# Patient Record
Sex: FEMALE | Race: WHITE | NOT HISPANIC OR LATINO | Employment: OTHER
[De-identification: names, ages, dates, MRNs, and addresses within clinical notes are randomized per-mention and may not be internally consistent; named-entity substitution may affect disease eponyms.]

---

## 2017-02-28 ENCOUNTER — PRIOR ORIGINAL RECORDS (OUTPATIENT)
Dept: OTHER | Age: 67
End: 2017-02-28

## 2017-03-02 PROCEDURE — 82340 ASSAY OF CALCIUM IN URINE: CPT

## 2017-03-03 ENCOUNTER — LAB ENCOUNTER (OUTPATIENT)
Dept: LAB | Facility: HOSPITAL | Age: 67
End: 2017-03-03
Attending: PHYSICAL MEDICINE & REHABILITATION
Payer: MEDICARE

## 2017-03-03 DIAGNOSIS — M81.0 SENILE OSTEOPOROSIS: Primary | ICD-10-CM

## 2017-03-03 LAB
CALCIUM URINE CALC: 218 MG/24HR (ref 42–353)
SPECIMEN VOL UR: 1450 ML

## 2017-04-04 ENCOUNTER — PRIOR ORIGINAL RECORDS (OUTPATIENT)
Dept: OTHER | Age: 67
End: 2017-04-04

## 2017-10-11 ENCOUNTER — PRIOR ORIGINAL RECORDS (OUTPATIENT)
Dept: OTHER | Age: 67
End: 2017-10-11

## 2017-11-29 ENCOUNTER — PRIOR ORIGINAL RECORDS (OUTPATIENT)
Dept: OTHER | Age: 67
End: 2017-11-29

## 2018-02-05 ENCOUNTER — PRIOR ORIGINAL RECORDS (OUTPATIENT)
Dept: OTHER | Age: 68
End: 2018-02-05

## 2018-03-06 ENCOUNTER — PRIOR ORIGINAL RECORDS (OUTPATIENT)
Dept: OTHER | Age: 68
End: 2018-03-06

## 2018-04-05 ENCOUNTER — PRIOR ORIGINAL RECORDS (OUTPATIENT)
Dept: OTHER | Age: 68
End: 2018-04-05

## 2018-05-11 ENCOUNTER — PRIOR ORIGINAL RECORDS (OUTPATIENT)
Dept: OTHER | Age: 68
End: 2018-05-11

## 2018-10-29 ENCOUNTER — PRIOR ORIGINAL RECORDS (OUTPATIENT)
Dept: OTHER | Age: 68
End: 2018-10-29

## 2018-11-12 ENCOUNTER — PRIOR ORIGINAL RECORDS (OUTPATIENT)
Dept: OTHER | Age: 68
End: 2018-11-12

## 2018-11-16 LAB
ALBUMIN: 4.2 G/DL
ALBUMIN: 4.3 G/DL
ALBUMIN: 4.3 G/DL
ALBUMIN: 4.4 G/DL
ALBUMIN: 4.4 G/DL
ALKALINE PHOSPHATATE(ALK PHOS): 49 IU/L
ALKALINE PHOSPHATATE(ALK PHOS): 52 IU/L
ALKALINE PHOSPHATATE(ALK PHOS): 59 IU/L
ALKALINE PHOSPHATATE(ALK PHOS): 61 IU/L
ALKALINE PHOSPHATATE(ALK PHOS): 66 IU/L
ALT (SGPT): 13 U/L
ALT (SGPT): 19 U/L
ALT (SGPT): 23 U/L
ALT (SGPT): 27 U/L
AST (SGOT): 26 U/L
AST (SGOT): 27 U/L
AST (SGOT): 30 U/L
AST (SGOT): 32 U/L
AST (SGOT): 33 U/L
BILIRUBIN TOTAL: 0.7 MG/DL
BILIRUBIN TOTAL: 0.8 MG/DL
BILIRUBIN TOTAL: 0.9 MG/DL
BILIRUBIN TOTAL: 1 MG/DL
BILIRUBIN TOTAL: 1 MG/DL
BUN: 15 MG/DL
BUN: 15 MG/DL
BUN: 19 MG/DL
BUN: 22 MG/DL
CALCIUM: 8.8 MG/DL
CALCIUM: 8.9 MG/DL
CALCIUM: 9.1 MG/DL
CALCIUM: 9.3 MG/DL
CHLORIDE: 102 MEQ/L
CHLORIDE: 103 MEQ/L
CHLORIDE: 104 MEQ/L
CHLORIDE: 106 MEQ/L
CHOLESTEROL, TOTAL: 140 MG/DL
CHOLESTEROL, TOTAL: 200 MG/DL
CHOLESTEROL, TOTAL: 291 MG/DL
CREATININE KINASE: 120 U/L
CREATININE KINASE: 157 U/L
CREATININE KINASE: 274 U/L
CREATININE, SERUM: 0.84 MG/DL
CREATININE, SERUM: 0.9 MG/DL
FREE T4: 1.45 MG/DL
FREE T4: 1.51 MG/DL
FREE T4: 1.53 MG/DL
FREE T4: 1.61 MG/DL
GLUCOSE: 100 MG/DL
GLUCOSE: 100 MG/DL
GLUCOSE: 85 MG/DL
GLUCOSE: 85 MG/DL
GLUCOSE: 88 MG/DL
GLUCOSE: 98 MG/DL
GLUCOSE: 98 MG/DL
HDL CHOLESTEROL: 110 MG/DL
HDL CHOLESTEROL: 66 MG/DL
HDL CHOLESTEROL: 97 MG/DL
HEMATOCRIT: 43 %
HEMATOCRIT: 43.3 %
HEMATOCRIT: 44.5 %
HEMATOCRIT: 46.4 %
HEMOGLOBIN: 14 G/DL
HEMOGLOBIN: 14.4 G/DL
HEMOGLOBIN: 14.4 G/DL
HEMOGLOBIN: 14.7 G/DL
LDL CHOLESTEROL: 168 MG/DL
LDL CHOLESTEROL: 58 MG/DL
LDL CHOLESTEROL: 94 MG/DL
MAGNESIUM: 2.1 MG/DL
NON-HDL CHOLESTEROL: 103 MG/DL
NON-HDL CHOLESTEROL: 181 MG/DL
NON-HDL CHOLESTEROL: 72 MG/DL
PLATELETS: 265 K/UL
PLATELETS: 294 K/UL
PLATELETS: 295 K/UL
PLATELETS: 315 K/UL
POTASSIUM, SERUM: 3.8 MEQ/L
POTASSIUM, SERUM: 4.2 MEQ/L
POTASSIUM, SERUM: 4.7 MEQ/L
POTASSIUM, SERUM: 5.2 MEQ/L
PROTEIN, TOTAL: 6.3 G/DL
PROTEIN, TOTAL: 6.5 G/DL
PROTEIN, TOTAL: 6.7 G/DL
PROTEIN, TOTAL: 6.7 G/DL
PROTEIN, TOTAL: 6.9 G/DL
PTH, INTACT: 35 PG/ML
RED BLOOD COUNT: 4.5 X 10-6/U
RED BLOOD COUNT: 4.53 X 10-6/U
RED BLOOD COUNT: 4.6 X 10-6/U
RED BLOOD COUNT: 4.8 X 10-6/U
SGOT (AST): 26 IU/L
SGOT (AST): 27 IU/L
SGOT (AST): 27 IU/L
SGOT (AST): 30 IU/L
SGOT (AST): 32 IU/L
SGOT (AST): 33 IU/L
SGPT (ALT): 13 IU/L
SGPT (ALT): 15 IU/L
SGPT (ALT): 19 IU/L
SGPT (ALT): 23 IU/L
SGPT (ALT): 27 IU/L
SODIUM: 137 MEQ/L
SODIUM: 140 MEQ/L
SODIUM: 141 MEQ/L
SODIUM: 143 MEQ/L
THYROID STIMULATING HORMONE: 0.55 MLU/L
THYROID STIMULATING HORMONE: 1.88 MLU/L
THYROID STIMULATING HORMONE: 3.66 MLU/L
THYROID STIMULATING HORMONE: 5.41 MLU/L
TRIGLYCERIDES: 47 MG/DL
TRIGLYCERIDES: 66 MG/DL
TRIGLYCERIDES: 71 MG/DL
VITAMIN D 25-OH: 38.1 NG/ML
VITAMIN D 25-OH: 48.1 NG/ML
VITAMIN D 25-OH: 51.4 NG/ML
VITAMIN D 25-OH: 59.7 NG/ML
WHITE BLOOD COUNT: 4.2 X 10-3/U
WHITE BLOOD COUNT: 4.4 X 10-3/U
WHITE BLOOD COUNT: 5.1 X 10-3/U
WHITE BLOOD COUNT: 5.2 X 10-3/U

## 2018-11-20 ENCOUNTER — HOSPITAL (OUTPATIENT)
Dept: OTHER | Age: 68
End: 2018-11-20
Attending: INTERNAL MEDICINE

## 2018-11-20 ENCOUNTER — IMAGING SERVICES (OUTPATIENT)
Dept: OTHER | Age: 68
End: 2018-11-20

## 2018-11-20 ENCOUNTER — PRIOR ORIGINAL RECORDS (OUTPATIENT)
Dept: OTHER | Age: 68
End: 2018-11-20

## 2018-11-20 LAB
CHOLESTEROL, TOTAL: 194 MG/DL
HDL CHOLESTEROL: 70 MG/DL
LDL CHOLESTEROL: 103 MG/DL
NON-HDL CHOLESTEROL: 118 MG/DL
TRIGLYCERIDES: 76 MG/DL

## 2018-11-21 ENCOUNTER — PRIOR ORIGINAL RECORDS (OUTPATIENT)
Dept: OTHER | Age: 68
End: 2018-11-21

## 2018-11-23 LAB
ALBUMIN: 4.2 G/DL
ALKALINE PHOSPHATATE(ALK PHOS): 55 IU/L
BILIRUBIN TOTAL: 0.6 MG/DL
BUN: 14 MG/DL
BUN: 19 MG/DL
CALCIUM: 9.3 MG/DL
CALCIUM: 9.8 MG/DL
CHLORIDE: 104 MEQ/L
CHLORIDE: 104 MEQ/L
CREATININE, SERUM: 0.8 MG/DL
CREATININE, SERUM: 0.9 MG/DL
GLUCOSE: 87 MG/DL
GLUCOSE: 90 MG/DL
POTASSIUM, SERUM: 4.3 MEQ/L
POTASSIUM, SERUM: 5 MEQ/L
PROTEIN, TOTAL: 6.2 G/DL
PTH, INTACT: 20 PG/ML
SGOT (AST): 27 IU/L
SGPT (ALT): 20 IU/L
SODIUM: 140 MEQ/L
SODIUM: 143 MEQ/L
UFCT: 265.25 CA SCORE
VITAMIN D 25-OH: 48 NG/ML

## 2018-11-29 ENCOUNTER — HOSPITAL ENCOUNTER (OUTPATIENT)
Dept: CV DIAGNOSTICS | Facility: HOSPITAL | Age: 68
Discharge: HOME | End: 2018-11-29
Attending: INTERNAL MEDICINE
Payer: MEDICARE

## 2018-11-29 DIAGNOSIS — R94.30 ABNORMAL RESULTS OF CARDIOVASCULAR FUNCTION STUDIES: ICD-10-CM

## 2018-11-29 DIAGNOSIS — Z82.49 FHX: CORONARY ARTERY DISEASE: ICD-10-CM

## 2018-11-29 DIAGNOSIS — I10 HYPERTENSION, ESSENTIAL: ICD-10-CM

## 2018-11-29 PROCEDURE — 93350 STRESS TTE ONLY: CPT | Performed by: INTERNAL MEDICINE

## 2018-11-29 PROCEDURE — 93018 CV STRESS TEST I&R ONLY: CPT | Performed by: INTERNAL MEDICINE

## 2018-11-29 PROCEDURE — 93017 CV STRESS TEST TRACING ONLY: CPT | Performed by: INTERNAL MEDICINE

## 2018-11-30 ENCOUNTER — PRIOR ORIGINAL RECORDS (OUTPATIENT)
Dept: OTHER | Age: 68
End: 2018-11-30

## 2018-12-04 ENCOUNTER — PRIOR ORIGINAL RECORDS (OUTPATIENT)
Dept: OTHER | Age: 68
End: 2018-12-04

## 2018-12-14 ENCOUNTER — PRIOR ORIGINAL RECORDS (OUTPATIENT)
Dept: OTHER | Age: 68
End: 2018-12-14

## 2018-12-28 ENCOUNTER — PRIOR ORIGINAL RECORDS (OUTPATIENT)
Dept: OTHER | Age: 68
End: 2018-12-28

## 2018-12-28 LAB
ALBUMIN: 4.3 G/DL
ALKALINE PHOSPHATATE(ALK PHOS): 67 IU/L
ALT (SGPT): 23 U/L
AST (SGOT): 32 U/L
BILIRUBIN TOTAL: 0.9 MG/DL
BUN: 18 MG/DL
CALCIUM: 9.1 MG/DL
CHLORIDE: 106 MEQ/L
CHOLESTEROL, TOTAL: 155 MG/DL
CREATININE, SERUM: 1 MG/DL
GLUCOSE: 90 MG/DL
GLUCOSE: 90 MG/DL
HDL CHOLESTEROL: 78 MG/DL
LDL CHOLESTEROL: 63 MG/DL
NON-HDL CHOLESTEROL: 77 MG/DL
POTASSIUM, SERUM: 5 MEQ/L
PROTEIN, TOTAL: 6.5 G/DL
SGOT (AST): 32 IU/L
SGPT (ALT): 23 IU/L
SODIUM: 141 MEQ/L
TRIGLYCERIDES: 71 MG/DL

## 2019-01-01 ENCOUNTER — EXTERNAL RECORD (OUTPATIENT)
Dept: HEALTH INFORMATION MANAGEMENT | Facility: OTHER | Age: 69
End: 2019-01-01

## 2019-02-28 VITALS
BODY MASS INDEX: 29.62 KG/M2 | SYSTOLIC BLOOD PRESSURE: 158 MMHG | TEMPERATURE: 98.8 F | HEART RATE: 70 BPM | DIASTOLIC BLOOD PRESSURE: 102 MMHG | WEIGHT: 128 LBS | OXYGEN SATURATION: 97 % | HEIGHT: 55 IN | RESPIRATION RATE: 18 BRPM

## 2019-05-09 ENCOUNTER — HOSPITAL (OUTPATIENT)
Dept: OTHER | Age: 69
End: 2019-05-09
Attending: INTERNAL MEDICINE

## 2019-11-06 RX ORDER — LOSARTAN POTASSIUM 50 MG/1
TABLET ORAL
Qty: 90 TABLET | Refills: 3 | Status: SHIPPED | OUTPATIENT
Start: 2019-11-06 | End: 2020-05-07 | Stop reason: SDUPTHER

## 2019-12-12 LAB
ALBUMIN SERPL-MCNC: 4.7 G/DL
ALBUMIN/GLOB SERPL: NORMAL {RATIO}
ALP SERPL-CCNC: 62 U/L
ALT SERPL-CCNC: 33 U/L
ANION GAP SERPL CALC-SCNC: NORMAL MMOL/L
AST SERPL-CCNC: 42 U/L
BILIRUB SERPL-MCNC: 0.9 MG/DL
BUN SERPL-MCNC: 18 MG/DL
BUN/CREAT SERPL: NORMAL
CALCIUM SERPL-MCNC: 9.4 MG/DL
CHLORIDE SERPL-SCNC: 105 MMOL/L
CHOLEST SERPL-MCNC: 150 MG/DL
CHOLEST/HDLC SERPL: NORMAL {RATIO}
CO2 SERPL-SCNC: NORMAL MMOL/L
CREAT SERPL-MCNC: 0.9 MG/DL
GLOBULIN SER-MCNC: NORMAL G/DL
GLUCOSE SERPL-MCNC: 86 MG/DL
HDLC SERPL-MCNC: 87 MG/DL
LDLC SERPL CALC-MCNC: 52 MG/DL
LENGTH OF FAST TIME PATIENT: NORMAL H
LENGTH OF FAST TIME PATIENT: NORMAL H
NONHDLC SERPL-MCNC: NORMAL MG/DL
POTASSIUM SERPL-SCNC: 4.7 MMOL/L
PROT SERPL-MCNC: 6.9 G/DL
SODIUM SERPL-SCNC: 141 MMOL/L
TRIGL SERPL-MCNC: 57 MG/DL
VLDLC SERPL CALC-MCNC: NORMAL MG/DL

## 2019-12-16 ENCOUNTER — TELEPHONE (OUTPATIENT)
Dept: CARDIOLOGY | Age: 69
End: 2019-12-16

## 2019-12-26 ENCOUNTER — CLINICAL ABSTRACT (OUTPATIENT)
Dept: CARDIOLOGY | Age: 69
End: 2019-12-26

## 2020-02-19 ENCOUNTER — TELEPHONE (OUTPATIENT)
Dept: CARDIOLOGY | Age: 70
End: 2020-02-19

## 2020-02-19 DIAGNOSIS — E78.2 MIXED HYPERLIPIDEMIA: Primary | ICD-10-CM

## 2020-02-19 RX ORDER — IMIQUIMOD 12.5 MG/.25G
CREAM TOPICAL
Refills: 1 | COMMUNITY
Start: 2020-01-31 | End: 2022-03-11

## 2020-02-19 RX ORDER — CALCIUM CARBONATE 300MG(750)
400 TABLET,CHEWABLE ORAL
COMMUNITY
Start: 2018-11-20 | End: 2022-03-11

## 2020-02-19 RX ORDER — DICLOFENAC SODIUM 75 MG/1
75 TABLET, DELAYED RELEASE ORAL 2 TIMES DAILY PRN
Refills: 0 | COMMUNITY
Start: 2020-01-03

## 2020-02-19 RX ORDER — LOSARTAN POTASSIUM 50 MG/1
50 TABLET ORAL
COMMUNITY
Start: 2018-11-20 | End: 2020-02-19 | Stop reason: SDUPTHER

## 2020-02-19 RX ORDER — LEVOTHYROXINE SODIUM 0.1 MG/1
100 TABLET ORAL EVERY OTHER DAY
Refills: 2 | COMMUNITY
Start: 2019-12-14

## 2020-02-19 RX ORDER — ROSUVASTATIN CALCIUM 10 MG/1
10 TABLET, COATED ORAL
COMMUNITY
Start: 2018-11-20 | End: 2020-02-19 | Stop reason: SDUPTHER

## 2020-02-19 RX ORDER — MULTIVIT WITH MINERALS/LUTEIN
1000 TABLET ORAL
COMMUNITY
Start: 2018-11-20

## 2020-02-19 RX ORDER — CLONAZEPAM 0.5 MG/1
0.5 TABLET ORAL 2 TIMES DAILY PRN
Refills: 0 | COMMUNITY
Start: 2020-01-06 | End: 2022-03-11

## 2020-02-19 RX ORDER — ELECTROLYTES/DEXTROSE
SOLUTION, ORAL ORAL
COMMUNITY
Start: 2018-11-20

## 2020-02-19 RX ORDER — ROSUVASTATIN CALCIUM 10 MG/1
10 TABLET, COATED ORAL DAILY
Qty: 90 TABLET | Refills: 3 | Status: SHIPPED | OUTPATIENT
Start: 2020-02-19 | End: 2020-05-07 | Stop reason: SDUPTHER

## 2020-05-07 ENCOUNTER — V-VISIT (OUTPATIENT)
Dept: CARDIOLOGY | Age: 70
End: 2020-05-07
Attending: INTERNAL MEDICINE

## 2020-05-07 ENCOUNTER — APPOINTMENT (OUTPATIENT)
Dept: CARDIOLOGY | Age: 70
End: 2020-05-07

## 2020-05-07 VITALS
DIASTOLIC BLOOD PRESSURE: 84 MMHG | TEMPERATURE: 97 F | HEIGHT: 62 IN | WEIGHT: 121.6 LBS | SYSTOLIC BLOOD PRESSURE: 113 MMHG | HEART RATE: 81 BPM | BODY MASS INDEX: 22.38 KG/M2

## 2020-05-07 DIAGNOSIS — E78.2 MIXED HYPERLIPIDEMIA: ICD-10-CM

## 2020-05-07 DIAGNOSIS — E03.9 ACQUIRED HYPOTHYROIDISM: ICD-10-CM

## 2020-05-07 DIAGNOSIS — I10 HYPERTENSION, BENIGN: Primary | ICD-10-CM

## 2020-05-07 PROCEDURE — 99214 OFFICE O/P EST MOD 30 MIN: CPT | Performed by: INTERNAL MEDICINE

## 2020-05-07 RX ORDER — LEVOTHYROXINE SODIUM 0.07 MG/1
75 TABLET ORAL EVERY OTHER DAY
COMMUNITY
Start: 2020-04-02

## 2020-05-07 RX ORDER — LOSARTAN POTASSIUM 50 MG/1
50 TABLET ORAL DAILY
Qty: 90 TABLET | Refills: 3 | Status: SHIPPED | OUTPATIENT
Start: 2020-05-07 | End: 2021-08-24

## 2020-05-07 RX ORDER — ROSUVASTATIN CALCIUM 10 MG/1
10 TABLET, COATED ORAL DAILY
Qty: 90 TABLET | Refills: 3 | Status: SHIPPED | OUTPATIENT
Start: 2020-05-07 | End: 2021-06-30

## 2020-05-07 SDOH — HEALTH STABILITY: PHYSICAL HEALTH: ON AVERAGE, HOW MANY MINUTES DO YOU ENGAGE IN EXERCISE AT THIS LEVEL?: 60 MIN

## 2020-05-07 SDOH — HEALTH STABILITY: MENTAL HEALTH
STRESS IS WHEN SOMEONE FEELS TENSE, NERVOUS, ANXIOUS, OR CAN'T SLEEP AT NIGHT BECAUSE THEIR MIND IS TROUBLED. HOW STRESSED ARE YOU?: ONLY A LITTLE

## 2020-05-07 SDOH — HEALTH STABILITY: MENTAL HEALTH: HOW OFTEN DO YOU HAVE A DRINK CONTAINING ALCOHOL?: 4 OR MORE TIMES A WEEK

## 2020-05-07 SDOH — HEALTH STABILITY: PHYSICAL HEALTH: ON AVERAGE, HOW MANY DAYS PER WEEK DO YOU ENGAGE IN MODERATE TO STRENUOUS EXERCISE (LIKE A BRISK WALK)?: 5 DAYS

## 2020-05-07 ASSESSMENT — ENCOUNTER SYMPTOMS
COUGH: 0
HEMATOCHEZIA: 0
FEVER: 0
ALLERGIC/IMMUNOLOGIC COMMENTS: NO NEW FOOD ALLERGIES
CHILLS: 0
WEIGHT LOSS: 0
WEIGHT GAIN: 0
HEMOPTYSIS: 0
BRUISES/BLEEDS EASILY: 0
SUSPICIOUS LESIONS: 0

## 2020-10-27 ENCOUNTER — APPOINTMENT (OUTPATIENT)
Dept: LAB | Age: 70
End: 2020-10-27
Attending: OTOLARYNGOLOGY
Payer: MEDICARE

## 2020-10-27 DIAGNOSIS — J32.8 OTHER CHRONIC SINUSITIS: ICD-10-CM

## 2021-03-25 ENCOUNTER — V-VISIT (OUTPATIENT)
Dept: CARDIOLOGY | Age: 71
End: 2021-03-25
Attending: INTERNAL MEDICINE

## 2021-03-25 DIAGNOSIS — E78.00 PURE HYPERCHOLESTEROLEMIA: ICD-10-CM

## 2021-03-25 DIAGNOSIS — I10 HYPERTENSION, BENIGN: Primary | ICD-10-CM

## 2021-03-25 DIAGNOSIS — E03.9 ACQUIRED HYPOTHYROIDISM: ICD-10-CM

## 2021-03-25 PROCEDURE — 99213 OFFICE O/P EST LOW 20 MIN: CPT | Performed by: INTERNAL MEDICINE

## 2021-03-25 SDOH — HEALTH STABILITY: PHYSICAL HEALTH: ON AVERAGE, HOW MANY MINUTES DO YOU ENGAGE IN EXERCISE AT THIS LEVEL?: 60 MIN

## 2021-03-25 SDOH — HEALTH STABILITY: PHYSICAL HEALTH: ON AVERAGE, HOW MANY DAYS PER WEEK DO YOU ENGAGE IN MODERATE TO STRENUOUS EXERCISE (LIKE A BRISK WALK)?: 5 DAYS

## 2021-03-25 ASSESSMENT — ENCOUNTER SYMPTOMS
HEMOPTYSIS: 0
HEMATOCHEZIA: 0
CHILLS: 0
BRUISES/BLEEDS EASILY: 0
WEIGHT LOSS: 0
ALLERGIC/IMMUNOLOGIC COMMENTS: NO NEW FOOD ALLERGIES
FEVER: 0
COUGH: 0
SUSPICIOUS LESIONS: 0
WEIGHT GAIN: 0

## 2021-03-30 ENCOUNTER — TELEPHONE (OUTPATIENT)
Dept: CARDIOLOGY | Age: 71
End: 2021-03-30

## 2021-04-29 ENCOUNTER — APPOINTMENT (OUTPATIENT)
Dept: CARDIOLOGY | Age: 71
End: 2021-04-29

## 2021-06-29 DIAGNOSIS — E78.2 MIXED HYPERLIPIDEMIA: ICD-10-CM

## 2021-06-30 RX ORDER — ROSUVASTATIN CALCIUM 10 MG/1
TABLET, COATED ORAL
Qty: 90 TABLET | Refills: 3 | Status: SHIPPED | OUTPATIENT
Start: 2021-06-30 | End: 2022-03-14 | Stop reason: SDUPTHER

## 2021-08-24 RX ORDER — LOSARTAN POTASSIUM 50 MG/1
TABLET ORAL
Qty: 90 TABLET | Refills: 2 | Status: SHIPPED | OUTPATIENT
Start: 2021-08-24 | End: 2022-07-18

## 2022-01-01 ENCOUNTER — EXTERNAL RECORD (OUTPATIENT)
Dept: OTHER | Age: 72
End: 2022-01-01

## 2022-01-20 ENCOUNTER — ORDER TRANSCRIPTION (OUTPATIENT)
Dept: ADMINISTRATIVE | Facility: HOSPITAL | Age: 72
End: 2022-01-20

## 2022-01-20 DIAGNOSIS — Z01.818 PREOP EXAMINATION: Primary | ICD-10-CM

## 2022-01-30 ENCOUNTER — LAB ENCOUNTER (OUTPATIENT)
Dept: LAB | Facility: HOSPITAL | Age: 72
End: 2022-01-30
Attending: FAMILY MEDICINE
Payer: MEDICARE

## 2022-01-30 DIAGNOSIS — Z01.818 PREOP EXAMINATION: ICD-10-CM

## 2022-01-31 LAB — SARS-COV-2 RNA RESP QL NAA+PROBE: NOT DETECTED

## 2022-02-02 ENCOUNTER — HOSPITAL ENCOUNTER (OUTPATIENT)
Dept: GENERAL RADIOLOGY | Facility: HOSPITAL | Age: 72
Discharge: HOME | End: 2022-02-02
Attending: FAMILY MEDICINE
Payer: MEDICARE

## 2022-02-02 DIAGNOSIS — T17.308D CHOKING, SUBSEQUENT ENCOUNTER: ICD-10-CM

## 2022-02-02 PROCEDURE — 74246 X-RAY XM UPR GI TRC 2CNTRST: CPT | Performed by: FAMILY MEDICINE

## 2022-03-10 ENCOUNTER — APPOINTMENT (OUTPATIENT)
Dept: CARDIOLOGY | Age: 72
End: 2022-03-10

## 2022-03-14 ENCOUNTER — OFFICE VISIT (OUTPATIENT)
Dept: CARDIOLOGY | Age: 72
End: 2022-03-14

## 2022-03-14 VITALS
DIASTOLIC BLOOD PRESSURE: 82 MMHG | WEIGHT: 113 LBS | HEART RATE: 60 BPM | SYSTOLIC BLOOD PRESSURE: 141 MMHG | BODY MASS INDEX: 20.84 KG/M2

## 2022-03-14 DIAGNOSIS — E78.2 MIXED HYPERLIPIDEMIA: ICD-10-CM

## 2022-03-14 DIAGNOSIS — I10 HYPERTENSION, BENIGN: Primary | ICD-10-CM

## 2022-03-14 DIAGNOSIS — E03.9 ACQUIRED HYPOTHYROIDISM: ICD-10-CM

## 2022-03-14 PROCEDURE — 99214 OFFICE O/P EST MOD 30 MIN: CPT | Performed by: INTERNAL MEDICINE

## 2022-03-14 RX ORDER — ROSUVASTATIN CALCIUM 10 MG/1
10 TABLET, COATED ORAL DAILY
Qty: 90 TABLET | Refills: 3 | Status: SHIPPED | OUTPATIENT
Start: 2022-03-14 | End: 2023-03-20 | Stop reason: SDUPTHER

## 2022-03-14 SDOH — HEALTH STABILITY: PHYSICAL HEALTH: ON AVERAGE, HOW MANY DAYS PER WEEK DO YOU ENGAGE IN MODERATE TO STRENUOUS EXERCISE (LIKE A BRISK WALK)?: 5 DAYS

## 2022-03-14 ASSESSMENT — PATIENT HEALTH QUESTIONNAIRE - PHQ9
SUM OF ALL RESPONSES TO PHQ9 QUESTIONS 1 AND 2: 0
SUM OF ALL RESPONSES TO PHQ9 QUESTIONS 1 AND 2: 0
1. LITTLE INTEREST OR PLEASURE IN DOING THINGS: NOT AT ALL
CLINICAL INTERPRETATION OF PHQ2 SCORE: NO FURTHER SCREENING NEEDED
2. FEELING DOWN, DEPRESSED OR HOPELESS: NOT AT ALL

## 2022-03-14 ASSESSMENT — ENCOUNTER SYMPTOMS
FEVER: 0
WEIGHT LOSS: 0
CHILLS: 0
HEMATOCHEZIA: 0
BRUISES/BLEEDS EASILY: 0
COUGH: 0
WEIGHT GAIN: 0
HEMOPTYSIS: 0
SUSPICIOUS LESIONS: 0
ALLERGIC/IMMUNOLOGIC COMMENTS: NO NEW FOOD ALLERGIES

## 2022-03-25 ENCOUNTER — TELEPHONE (OUTPATIENT)
Dept: CARDIOLOGY | Age: 72
End: 2022-03-25

## 2022-03-25 DIAGNOSIS — E78.2 MIXED HYPERLIPIDEMIA: Primary | ICD-10-CM

## 2022-03-25 RX ORDER — EZETIMIBE 10 MG/1
10 TABLET ORAL DAILY
Qty: 30 TABLET | Refills: 11 | Status: SHIPPED | OUTPATIENT
Start: 2022-03-25 | End: 2022-05-17 | Stop reason: SDUPTHER

## 2022-05-04 ENCOUNTER — LAB SERVICES (OUTPATIENT)
Dept: LAB | Age: 72
End: 2022-05-04

## 2022-05-04 DIAGNOSIS — E78.2 MIXED HYPERLIPIDEMIA: ICD-10-CM

## 2022-05-04 PROCEDURE — 80061 LIPID PANEL: CPT | Performed by: CLINICAL MEDICAL LABORATORY

## 2022-05-04 PROCEDURE — 36415 COLL VENOUS BLD VENIPUNCTURE: CPT | Performed by: INTERNAL MEDICINE

## 2022-05-04 PROCEDURE — 80053 COMPREHEN METABOLIC PANEL: CPT | Performed by: CLINICAL MEDICAL LABORATORY

## 2022-05-05 LAB
ALBUMIN SERPL-MCNC: 3.9 G/DL (ref 3.6–5.1)
ALBUMIN/GLOB SERPL: 1.3 {RATIO} (ref 1–2.4)
ALP SERPL-CCNC: 58 UNITS/L (ref 45–117)
ALT SERPL-CCNC: 30 UNITS/L
ANION GAP SERPL CALC-SCNC: 12 MMOL/L (ref 10–20)
AST SERPL-CCNC: 38 UNITS/L
BILIRUB SERPL-MCNC: 1.2 MG/DL (ref 0.2–1)
BUN SERPL-MCNC: 26 MG/DL (ref 6–20)
BUN/CREAT SERPL: 25 (ref 7–25)
CALCIUM SERPL-MCNC: 9.4 MG/DL (ref 8.4–10.2)
CHLORIDE SERPL-SCNC: 109 MMOL/L (ref 98–107)
CHOLEST SERPL-MCNC: 158 MG/DL
CHOLEST/HDLC SERPL: 1.5 {RATIO}
CO2 SERPL-SCNC: 26 MMOL/L (ref 21–32)
CREAT SERPL-MCNC: 1.03 MG/DL (ref 0.51–0.95)
FASTING DURATION TIME PATIENT: ABNORMAL H
FASTING DURATION TIME PATIENT: NORMAL H
GFR SERPLBLD BASED ON 1.73 SQ M-ARVRAT: 58 ML/MIN
GLOBULIN SER-MCNC: 2.9 G/DL (ref 2–4)
GLUCOSE SERPL-MCNC: 88 MG/DL (ref 70–99)
HDLC SERPL-MCNC: 106 MG/DL
LDLC SERPL CALC-MCNC: 39 MG/DL
NONHDLC SERPL-MCNC: 52 MG/DL
POTASSIUM SERPL-SCNC: 4.5 MMOL/L (ref 3.4–5.1)
PROT SERPL-MCNC: 6.8 G/DL (ref 6.4–8.2)
SODIUM SERPL-SCNC: 142 MMOL/L (ref 135–145)
TRIGL SERPL-MCNC: 64 MG/DL

## 2022-05-06 ENCOUNTER — E-ADVICE (OUTPATIENT)
Dept: CARDIOLOGY | Age: 72
End: 2022-05-06

## 2022-05-06 DIAGNOSIS — E78.00 PURE HYPERCHOLESTEROLEMIA: Primary | ICD-10-CM

## 2022-05-15 ENCOUNTER — E-ADVICE (OUTPATIENT)
Dept: CARDIOLOGY | Age: 72
End: 2022-05-15

## 2022-05-17 RX ORDER — EZETIMIBE 10 MG/1
10 TABLET ORAL DAILY
Qty: 90 TABLET | Refills: 3 | Status: SHIPPED | OUTPATIENT
Start: 2022-05-17 | End: 2023-03-20 | Stop reason: SDUPTHER

## 2022-07-18 RX ORDER — LOSARTAN POTASSIUM 50 MG/1
TABLET ORAL
Qty: 90 TABLET | Refills: 2 | Status: SHIPPED | OUTPATIENT
Start: 2022-07-18 | End: 2023-03-20 | Stop reason: SDUPTHER

## 2023-03-17 ENCOUNTER — APPOINTMENT (OUTPATIENT)
Dept: CARDIOLOGY | Age: 73
End: 2023-03-17

## 2023-03-20 ENCOUNTER — OFFICE VISIT (OUTPATIENT)
Dept: CARDIOLOGY | Age: 73
End: 2023-03-20

## 2023-03-20 VITALS
SYSTOLIC BLOOD PRESSURE: 128 MMHG | HEIGHT: 62 IN | DIASTOLIC BLOOD PRESSURE: 84 MMHG | BODY MASS INDEX: 22.45 KG/M2 | WEIGHT: 122 LBS | HEART RATE: 65 BPM

## 2023-03-20 DIAGNOSIS — E03.9 ACQUIRED HYPOTHYROIDISM: ICD-10-CM

## 2023-03-20 DIAGNOSIS — R93.1 ELEVATED CORONARY ARTERY CALCIUM SCORE: ICD-10-CM

## 2023-03-20 DIAGNOSIS — I10 HYPERTENSION, BENIGN: Primary | ICD-10-CM

## 2023-03-20 DIAGNOSIS — E78.2 MIXED HYPERLIPIDEMIA: ICD-10-CM

## 2023-03-20 PROCEDURE — 99214 OFFICE O/P EST MOD 30 MIN: CPT | Performed by: INTERNAL MEDICINE

## 2023-03-20 RX ORDER — LOSARTAN POTASSIUM 50 MG/1
50 TABLET ORAL DAILY
Qty: 90 TABLET | Refills: 3 | Status: SHIPPED | OUTPATIENT
Start: 2023-03-20

## 2023-03-20 RX ORDER — EZETIMIBE 10 MG/1
10 TABLET ORAL DAILY
Qty: 90 TABLET | Refills: 3 | Status: SHIPPED | OUTPATIENT
Start: 2023-03-20

## 2023-03-20 RX ORDER — ROSUVASTATIN CALCIUM 10 MG/1
10 TABLET, COATED ORAL DAILY
Qty: 90 TABLET | Refills: 3 | Status: SHIPPED | OUTPATIENT
Start: 2023-03-20

## 2023-03-20 ASSESSMENT — ENCOUNTER SYMPTOMS
WEIGHT GAIN: 0
BRUISES/BLEEDS EASILY: 0
ALLERGIC/IMMUNOLOGIC COMMENTS: NO NEW FOOD ALLERGIES
HEMATOCHEZIA: 0
CHILLS: 0
HEMOPTYSIS: 0
FEVER: 0
WEIGHT LOSS: 0
COUGH: 0
SUSPICIOUS LESIONS: 0

## 2023-04-28 ENCOUNTER — TELEPHONE (OUTPATIENT)
Dept: CARDIOLOGY | Age: 73
End: 2023-04-28

## 2023-05-08 ENCOUNTER — APPOINTMENT (OUTPATIENT)
Dept: CARDIOLOGY | Age: 73
End: 2023-05-08
Attending: INTERNAL MEDICINE

## 2023-05-15 ENCOUNTER — APPOINTMENT (OUTPATIENT)
Dept: CARDIOLOGY | Age: 73
End: 2023-05-15
Attending: INTERNAL MEDICINE

## 2023-07-12 ENCOUNTER — E-ADVICE (OUTPATIENT)
Dept: CARDIOLOGY | Age: 73
End: 2023-07-12

## 2023-07-12 DIAGNOSIS — I25.10 ATHEROSCLEROSIS OF NATIVE CORONARY ARTERY OF NATIVE HEART WITHOUT ANGINA PECTORIS: Primary | ICD-10-CM

## 2023-07-12 DIAGNOSIS — R93.1 AGATSTON CAC SCORE 200-399: ICD-10-CM

## 2023-07-20 ENCOUNTER — APPOINTMENT (OUTPATIENT)
Dept: CARDIOLOGY | Age: 73
End: 2023-07-20
Attending: INTERNAL MEDICINE

## 2023-07-31 ENCOUNTER — ORDER TRANSCRIPTION (OUTPATIENT)
Dept: PHYSICAL THERAPY | Facility: HOSPITAL | Age: 73
End: 2023-07-31

## 2023-07-31 DIAGNOSIS — R49.0 HOARSENESS: Primary | ICD-10-CM

## 2023-08-07 ENCOUNTER — ANCILLARY PROCEDURE (OUTPATIENT)
Dept: CARDIOLOGY | Age: 73
End: 2023-08-07
Attending: INTERNAL MEDICINE

## 2023-08-07 VITALS — WEIGHT: 122 LBS | HEIGHT: 62 IN | BODY MASS INDEX: 22.45 KG/M2

## 2023-08-07 DIAGNOSIS — I25.10 ATHEROSCLEROSIS OF NATIVE CORONARY ARTERY OF NATIVE HEART WITHOUT ANGINA PECTORIS: ICD-10-CM

## 2023-08-07 DIAGNOSIS — R93.1 AGATSTON CAC SCORE 200-399: ICD-10-CM

## 2023-08-07 LAB
HEART RATE RESERVE PREDICTED: 31.51 BPM
LV EF: 67 %
RESTING HR ACHIEVED: 59 BPM
STRESS BASELINE BP: NORMAL MMHG
STRESS PEAK HR: 100 BPM
STRESS PERCENT HR: 68 %
STRESS POST EXERCISE DUR MIN: 4 MIN
STRESS POST PEAK BP: NORMAL MMHG
STRESS TARGET HR: 146 BPM

## 2023-08-07 PROCEDURE — A9502 TC99M TETROFOSMIN: HCPCS | Performed by: INTERNAL MEDICINE

## 2023-08-07 PROCEDURE — 78452 HT MUSCLE IMAGE SPECT MULT: CPT | Performed by: INTERNAL MEDICINE

## 2023-08-07 PROCEDURE — 93015 CV STRESS TEST SUPVJ I&R: CPT | Performed by: INTERNAL MEDICINE

## 2023-08-07 RX ORDER — REGADENOSON 0.08 MG/ML
0.4 INJECTION, SOLUTION INTRAVENOUS ONCE
Status: COMPLETED | OUTPATIENT
Start: 2023-08-07 | End: 2023-08-07

## 2023-08-07 RX ADMIN — REGADENOSON 0.4 MG: 0.08 INJECTION, SOLUTION INTRAVENOUS at 13:55

## 2023-08-07 ASSESSMENT — EXERCISE STRESS TEST
PEAK_RPP: 13860
STAGE_CATEGORIES: RECOVERY 1
PEAK_HR: 99
PEAK_BP: 140/78
PEAK_RPP: 8848
PEAK_HR: 92
MPH: 1
STAGE_CATEGORIES: 1
STAGE_CATEGORIES: RESTING
PEAK_RPP: 8280
PEAK_HR: 99
PEAK_BP: 120/70
PEAK_BP: 140/78
COMMENTS: REGADENOSON INJECTED
PEAK_HR: 59
STRESS_SYMPTOMS: HEADACHE
STRESS_SYMPTOMS: HEADACHE
PEAK_BP: 112/60
PEAK_BP: 120/68
PEAK_BP: 140/64
PEAK_HR: 79
MPH: 1
PEAK_RPP: 7080
PEAK_HR: 69
STAGE_CATEGORIES: RECOVERY 2
PEAK_RPP: 12880
STAGE_CATEGORIES: 2
STOPPAGE_REASON: PROTOCOL COMPLETE
STAGE_CATEGORIES: RECOVERY 0
PEAK_RPP: 13860

## 2023-08-15 ENCOUNTER — OFFICE VISIT (OUTPATIENT)
Dept: SPEECH THERAPY | Facility: HOSPITAL | Age: 73
End: 2023-08-15
Attending: OTOLARYNGOLOGY
Payer: MEDICARE

## 2023-08-15 DIAGNOSIS — R49.0 HOARSENESS: Primary | ICD-10-CM

## 2023-08-15 DIAGNOSIS — R49.0 DYSPHONIA: ICD-10-CM

## 2023-08-15 PROCEDURE — 92524 BEHAVRAL QUALIT ANALYS VOICE: CPT

## 2023-08-15 NOTE — PROGRESS NOTES
ADULT VOICE EVALUATION:     Diagnosis:   Hoarseness (R49.0)  Dysphonia (R49.0)      Referring Provider: Anthony Basilio  Date of Evaluation:    8/15/2023    Precautions:  None Next MD visit:   none scheduled  Date of Surgery: n/a     Speech-language evaluation completed per MD order. Patient arrived on time to evaluation session. Patient participated actively in assessment tasks. PATIENT SUMMARY   Milad Olivares is a 75year old female who presents to therapy today with complaints of hoarseness. Current functional limitations include change in vocal function and frustration regarding vocal quality. Pain: No pain reported on this date. Recent Medical History:   Past Medical History:   Diagnosis Date    Dry eyes 7/26/2011    HYPOTHYROIDISM     MENOPAUSE     Restless leg syndrome     Sciatica 9/13/2011   Current Medications: pregabalin (LYRICA) 25 MG Oral Cap, 25 mg at 6 pm and 75 mg before bed, Disp: 120 capsule, Rfl: 1  pregabalin 25 MG Oral Cap, Take 1 capsule (25 mg total) by mouth 3 (three) times daily as needed. , Disp: 90 capsule, Rfl: 3  Azelastine HCl 137 MCG/SPRAY Nasal Solution, 1 spray by Nasal route 2 (two) times daily. , Disp: 1 Bottle, Rfl: 5  Levothyroxine Sodium (SYNTHROID) 100 MCG Oral Tab, Take 1 tablet by mouth daily. , Disp: 90 tablet, Rfl: 1  Calcium Citrate-Vitamin D (CITRACAL + D OR), 605 mg / 500 mg 2 daily, Disp: , Rfl:   VITAMIN D 2000 UNIT OR CAPS, 1 CAPSULE DAILY, Disp: , Rfl:   MULTIVITAMINS OR TABS, 1 TABLET DAILY, Disp: , Rfl:   VITAMIN B COMPLEX OR CAPS, 1 TAB DAILY, Disp: , Rfl:     No current facility-administered medications on file prior to visit. VOICE HISTORY  Current Voice Diagnosis: Hoarseness  Date of ENT Evaluation: 7/31/2023  History of current complaint/diagnosis: Last 2 years, possibly following COVID-19 infection but never tested positive  Current Vocal Demands: social, face-to-face conversations with family and friends    Laryngoscopy Findings (7/31/2023):   \"No acute findings at this time. Mtd right vc more than left  Good squeeze\"     Porsha Tovar describes prior level of function WFL prior to 2 years ago. Pt goals include improving vocal quality. Shayla Hale presents to speech therapy evaluation with primary c/o hoarseness. The results of the objective tests and measures show dysphonia characterized by hoarseness and variable pitch. Functional deficits include but are not limited to frustration with vocal quality. Signs and symptoms are consistent with diagnosis of hoarseness and dysphonia. Pt and SLP discussed evaluation findings, pathology, POC and HEP. Pt voiced understanding and performs HEP correctly. Skilled Speech Therapy is medically necessary to address the above impairments and reach functional goals. OBJECTIVE:   VOCAL ANALYSIS (Consensus Auditory-Perceptual Evaluation of Voice (CAPE-V) Completed)  Overall Severity: Mild-Moderately Deviant, Consistent, and Score: 30/100  Roughness: Mild-Moderately Deviant, Consistent, and Score: 30/100  Breathiness: Mildly Deviant, Consistent, and Score: 0/100  Strain: Mild-Moderately Deviant, Inconsistent, and Score: 25/100  Pitch: Mild-Moderately Deviant, Inconsistent, and Score: 30/100  Loudness: Mildly Deviant, Consistent, and Score: 0/100    S/Z Ratio: 1 (>1.4 is abnormal)  Maximum Phonation time: 12 seconds  Resonance: WFL    Vocal Handicap Index (VHI) Score:   Functional: none, 0/40   Physical: mild, 6/40   Emotional: mild, 3/40   Total Score: mild, 9/120    Vocal Abuses: caffeine products used    Respiration: WFL    Today's Treatment:  Pt education was provided on exam findings, treatment diagnosis, treatment plan, expectations, and prognosis. Pt was also provided recommendations for use of vocal hygiene strategies.     Patient was instructed in and issued a HEP for: vocal hygiene strategies and humming with focus on resonant voicing    Charges: Raymon x1, [id number]      Total Timed Treatment: 40 min    PLAN OF CARE:    Goals: (to be met in 8 visits)   STG 1: Patient will independently name and report use of appropriate vocal hygiene strategies over the duration of 2 weeks. STG 2: Patient will demonstrate resonant voicing during /m/ gliding and chanting in 8/10 attempts, given min verbal and viusal cueing. STG 3:  Patient will demonstrate resonant voicing during production of /m/ initial words and sentences loaded with /m/ in 8/10 attempts, given min verbal and visual cueing. STG 4: Patient will demonstrate resonant voicing in 5-9 word sentences read aloud in 8/10 trials, given min verbal and visual cueing. Frequency / Duration: Patient will be seen for 1x/week or a total of 10 visits over a 90 day period. Treatment will include: speech therapy    Education or treatment limitation: None  Rehab Potential:good      Patient/Family/Caregiver was advised of these findings, precautions, and treatment options and has agreed to actively participate in planning and for this course of care. Thank you for your referral. Please co-sign or sign and return this letter via fax as soon as possible to [phone]. If you have any questions, please contact me at Dept: [phone]    Sincerely,  Electronically signed by therapist: Don Ceja, SLP  [de-identified] certification required: Yes  I certify the need for these services furnished under this plan of treatment and while under my care.     X___________________________________________________ Date____________________    Certification From: 3/16/7991  To:11/13/2023

## 2023-08-21 ENCOUNTER — APPOINTMENT (OUTPATIENT)
Dept: CT IMAGING | Age: 73
End: 2023-08-21

## 2023-08-22 ENCOUNTER — OFFICE VISIT (OUTPATIENT)
Dept: SPEECH THERAPY | Facility: HOSPITAL | Age: 73
End: 2023-08-22
Attending: OTOLARYNGOLOGY
Payer: MEDICARE

## 2023-08-22 PROCEDURE — 92507 TX SP LANG VOICE COMM INDIV: CPT

## 2023-08-22 NOTE — PROGRESS NOTES
Diagnosis:   Hoarseness (R49.0)  Dysphonia (R49.0)      Referring Provider: Sonia Guzman  Date of Evaluation:   8/15/2023    Precautions:  None Next MD visit:   none scheduled  Date of Surgery: n/a   Insurance Primary/Secondary: MEDICARE / Keysha Chatterjee       # Visits on POC: 8   Total Timed Treatment: 45 min  Date POC Expires: 11/13/2023  Charges: [id number]       Treatment Number: 2    Subjective: Patient arrived on time to session. Patient participated actively in therapeutic tasks. Patient reports increased hoarseness on this date and attributes this to stopping nasal spray per allergist recommendations. Patient to d/c previous nasal spray and start Ryaltris (olopatadine hydrochloride and mometasone furoate) in one week. Pain: No pain reported on this date. Objective:  Goals: (to be met in 8 visits)   STG 1: Patient will independently name and report use of appropriate vocal hygiene strategies over the duration of 2 weeks. Progress: Able to review vocal hygiene strategies. STG 2: Patient will demonstrate resonant voicing and laryngeal relaxation during gliding and chanting in 8/10 attempts, given min verbal and visual cueing. Progress: Utilized chanting to reduce laryngeal tension and support resonant voicing within 7/10 attempts given mod verbal and visual cues. STG 3:  Patient will demonstrate resonant voicing and laryngeal relaxation during production of /m/ and /h/ initial words and sentences in 8/10 attempts, given min verbal and visual cueing. Progress: 6/10 given mod verbal and visual cues    STG 4: Patient will demonstrate resonant voicing in 5-9 word sentences read aloud in 8/10 trials, given min verbal and visual cueing. Progress: Goal not targeted on this date d/t focus on other goal areas. HEP: Laryngeal relaxation exercises  Education: Educated on use of resonant voicing and laryngeal relaxation. Assessment: Patient presents with hoarseness and muscle tension dysphonia. Patient's deficits impact her ability to communicate without frustration. On this date, patient completed initial training of diaphragmatic breathing techniques, laryngeal relaxation exercises, and resonant voicing. Patient benefited from verbal and visual cueing to engage techniques and increase awareness of performance. Patient able to modify voice to reduce hoarseness and tension. Plan: Continue speech-language therapy targeting voice.

## 2023-08-29 ENCOUNTER — OFFICE VISIT (OUTPATIENT)
Dept: SPEECH THERAPY | Facility: HOSPITAL | Age: 73
End: 2023-08-29
Attending: OTOLARYNGOLOGY
Payer: MEDICARE

## 2023-08-29 PROCEDURE — 92507 TX SP LANG VOICE COMM INDIV: CPT

## 2023-09-05 ENCOUNTER — OFFICE VISIT (OUTPATIENT)
Dept: SPEECH THERAPY | Facility: HOSPITAL | Age: 73
End: 2023-09-05
Attending: OTOLARYNGOLOGY
Payer: MEDICARE

## 2023-09-05 PROCEDURE — 92507 TX SP LANG VOICE COMM INDIV: CPT

## 2023-09-05 NOTE — PROGRESS NOTES
Diagnosis:   Hoarseness (R49.0)  Dysphonia (R49.0)      Referring Provider: Analia Valderrama  Date of Evaluation:   8/15/2023    Precautions:  None Next MD visit:   none scheduled  Date of Surgery: n/a   Insurance Primary/Secondary: MEDICARE / [facility]       # Visits on POC: 8   Total Timed Treatment: 45 min  Date POC Expires: 11/13/2023  Charges: [id number]       Treatment Number: 4   Discharge Summary  Pt has attended 4 visits in Speech Therapy. Dear Dr. Analia Valderrama,  This letter is to inform you of Nicole Baires's progress in speech-language therapy. Since her initial evaluation, she has attended 4 sessions. Therapy sessions have targeted resonant voicing and relaxed throat breathing. A home exercise program (HEP) addressing practice with resonant voicing exercises and use of vocal hygiene practices has been provided and completed consistently. During this progress period, Nicole Whitfield has demonstrated improved awareness of vocal production and ability to modify her phonation to support oral resonance. At this time, Nicole Whitfield has expressed desire to discontinue speech therapy sessions due to scheduling conflicts and to focus on HEP. As Nicole Whitfield has increased her ability to self-monitor and complete HEP, she demonstrates a good prognosis for continued progress apart from skilled sessions. Thank you for your referral. Please re-consult should further concerns arise. Subjective: Patient arrived on time to session. Patient participated actively in therapeutic tasks. Patient stopped Ryaltris prior to allergy appointment and reports increased hoarseness. Patient indicated that she would like to discontinue further sessions at this time d/t scheduling conflicts. Reports consistent completion of HEP and all questions answered regarding HEP. Pain: No pain reported on this date.      Objective:  Goals: (to be met in 8 visits)   STG 1: Patient will independently name and report use of appropriate vocal hygiene strategies over the duration of 2 weeks. Progress: Demonstrated understanding of vocal hygiene strategies. STG 2: Patient will demonstrate resonant voicing and laryngeal relaxation during gliding and chanting in 8/10 attempts, given min verbal and visual cueing. Progress: Utilized chanting to reduce laryngeal tension and support resonant voicing within 8/10 attempts given min verbal and visual cues. Goal met. STG 3:  Patient will demonstrate resonant voicing and laryngeal relaxation during production of /m/ and /h/ initial words and sentences in 8/10 attempts, given min verbal and visual cueing. Progress: 7/10 given min verbal and visual cues    STG 4: Patient will demonstrate resonant voicing in 5-9 word sentences read aloud in 8/10 trials, given min verbal and visual cueing. Progress: 7/10 trials given min verbal and visual cues    HEP: Laryngeal relaxation and resonant voicing exercises  Education: Educated on use of resonant voicing and laryngeal relaxation. Assessment: Patient presented to speech therapy with hoarseness and muscle tension dysphonia. Patient's deficits impacted her ability to communicate without frustration. On this date, patient reviewed exercises targeting laryngeal relaxation, breath support, and resonant voicing. Patient has demonstrated an increased ability to accurately self-monitor productions and modify vocal production to facilitate oral resonance. Patient demonstrated functional gains in use of oral resonance during functional conversation given increased awareness.     VOCAL ANALYSIS     Evaluation 8/15/2023 - Consensus Auditory-Perceptual Evaluation of Voice (CAPE-V)   Overall Severity: Mild-Moderately Deviant, Consistent, and Score: 30/100  Roughness: Mild-Moderately Deviant, Consistent, and Score: 30/100  Breathiness: Mildly Deviant, Consistent, and Score: 0/100  Strain: Mild-Moderately Deviant, Inconsistent, and Score: 25/100  Pitch: Mild-Moderately Deviant, Inconsistent, and Score: 30/100  Loudness: Mildly Deviant, Consistent, and Score: 0/100    Discharge 9/5/2023 - Consensus Auditory-Perceptual Evaluation of Voice (CAPE-V)   Overall Severity: Mild-Moderately Deviant, Inconsistent, and Score: 25/100  Roughness: Mild-Moderately Deviant, Inconsistent, and Score: 25/100  Breathiness: Mildly Deviant, Consistent, and Score: 0/100  Strain: Mildly Deviant, Inconsistent, and Score: 10/100  Pitch: Mildly Deviant, Inconsistent, and Score: 15/100  Loudness: Mildly Deviant, Consistent, and Score: 0/100    Plan: Patient to be discharged from speech-language therapy as she request d/c at this time. Patient to continue completion of HEP. Patient/Family/Caregiver was advised of these findings, precautions, and treatment options and has agreed to actively participate in planning and for this course of care. Thank you for your referral. If you have any questions, please contact me at Dept: [phone].     Sincerely,  Electronically signed by therapist: ARPIT Coronado

## 2023-09-12 ENCOUNTER — APPOINTMENT (OUTPATIENT)
Dept: SPEECH THERAPY | Facility: HOSPITAL | Age: 73
End: 2023-09-12
Attending: OTOLARYNGOLOGY
Payer: MEDICARE

## 2023-09-13 ENCOUNTER — APPOINTMENT (OUTPATIENT)
Dept: SPEECH THERAPY | Facility: HOSPITAL | Age: 73
End: 2023-09-13
Attending: OTOLARYNGOLOGY
Payer: MEDICARE

## 2023-09-19 ENCOUNTER — APPOINTMENT (OUTPATIENT)
Dept: SPEECH THERAPY | Facility: HOSPITAL | Age: 73
End: 2023-09-19
Attending: OTOLARYNGOLOGY
Payer: MEDICARE

## 2024-03-04 RX ORDER — EZETIMIBE 10 MG/1
10 TABLET ORAL DAILY
Qty: 90 TABLET | Refills: 0 | Status: SHIPPED | OUTPATIENT
Start: 2024-03-04

## 2024-03-12 ENCOUNTER — E-ADVICE (OUTPATIENT)
Dept: CARDIOLOGY | Age: 74
End: 2024-03-12

## 2024-03-12 DIAGNOSIS — I25.10 ATHEROSCLEROSIS OF NATIVE CORONARY ARTERY, UNSPECIFIED WHETHER ANGINA PRESENT, UNSPECIFIED WHETHER NATIVE OR TRANSPLANTED HEART: ICD-10-CM

## 2024-03-12 DIAGNOSIS — E03.9 ACQUIRED HYPOTHYROIDISM: ICD-10-CM

## 2024-03-12 DIAGNOSIS — E78.00 PURE HYPERCHOLESTEROLEMIA: ICD-10-CM

## 2024-03-12 DIAGNOSIS — I10 HYPERTENSION, BENIGN: Primary | ICD-10-CM

## 2024-03-15 RX ORDER — ZOLPIDEM TARTRATE 10 MG/1
10 TABLET ORAL
COMMUNITY
Start: 2024-02-20

## 2024-03-15 RX ORDER — PREGABALIN 25 MG/1
75 CAPSULE ORAL
COMMUNITY
Start: 2023-12-18

## 2024-03-18 ENCOUNTER — LAB SERVICES (OUTPATIENT)
Dept: LAB | Age: 74
End: 2024-03-18

## 2024-03-18 ENCOUNTER — APPOINTMENT (OUTPATIENT)
Dept: CARDIOLOGY | Age: 74
End: 2024-03-18

## 2024-03-18 DIAGNOSIS — E03.9 ACQUIRED HYPOTHYROIDISM: ICD-10-CM

## 2024-03-18 DIAGNOSIS — E78.00 PURE HYPERCHOLESTEROLEMIA: ICD-10-CM

## 2024-03-18 DIAGNOSIS — I10 HYPERTENSION, BENIGN: ICD-10-CM

## 2024-03-18 DIAGNOSIS — I25.10 ATHEROSCLEROSIS OF NATIVE CORONARY ARTERY, UNSPECIFIED WHETHER ANGINA PRESENT, UNSPECIFIED WHETHER NATIVE OR TRANSPLANTED HEART: ICD-10-CM

## 2024-03-18 PROCEDURE — 36415 COLL VENOUS BLD VENIPUNCTURE: CPT | Performed by: INTERNAL MEDICINE

## 2024-03-19 ENCOUNTER — APPOINTMENT (OUTPATIENT)
Dept: CARDIOLOGY | Age: 74
End: 2024-03-19

## 2024-03-19 VITALS
DIASTOLIC BLOOD PRESSURE: 84 MMHG | BODY MASS INDEX: 23.37 KG/M2 | HEIGHT: 62 IN | WEIGHT: 127 LBS | SYSTOLIC BLOOD PRESSURE: 130 MMHG | HEART RATE: 61 BPM

## 2024-03-19 DIAGNOSIS — I10 HYPERTENSION, BENIGN: ICD-10-CM

## 2024-03-19 DIAGNOSIS — E03.9 ACQUIRED HYPOTHYROIDISM: ICD-10-CM

## 2024-03-19 DIAGNOSIS — I25.10 ATHEROSCLEROSIS OF NATIVE CORONARY ARTERY OF NATIVE HEART WITHOUT ANGINA PECTORIS: Primary | ICD-10-CM

## 2024-03-19 DIAGNOSIS — E78.2 MIXED HYPERLIPIDEMIA: ICD-10-CM

## 2024-03-19 DIAGNOSIS — R93.1 AGATSTON CAC SCORE 200-399: ICD-10-CM

## 2024-03-19 LAB
ALBUMIN SERPL-MCNC: 4 G/DL (ref 3.6–5.1)
ALBUMIN/GLOB SERPL: 1.4 {RATIO} (ref 1–2.4)
ALP SERPL-CCNC: 67 UNITS/L (ref 45–117)
ALT SERPL-CCNC: 32 UNITS/L
ANION GAP SERPL CALC-SCNC: 10 MMOL/L (ref 7–19)
AST SERPL-CCNC: 44 UNITS/L
BILIRUB SERPL-MCNC: 1.4 MG/DL (ref 0.2–1)
BUN SERPL-MCNC: 18 MG/DL (ref 6–20)
BUN/CREAT SERPL: 18 (ref 7–25)
CALCIUM SERPL-MCNC: 9.4 MG/DL (ref 8.4–10.2)
CHLORIDE SERPL-SCNC: 109 MMOL/L (ref 97–110)
CHOLEST SERPL-MCNC: 155 MG/DL
CHOLEST/HDLC SERPL: 1.8 {RATIO}
CO2 SERPL-SCNC: 26 MMOL/L (ref 21–32)
CREAT SERPL-MCNC: 0.98 MG/DL (ref 0.51–0.95)
EGFRCR SERPLBLD CKD-EPI 2021: 61 ML/MIN/{1.73_M2}
FASTING DURATION TIME PATIENT: ABNORMAL H
GLOBULIN SER-MCNC: 2.8 G/DL (ref 2–4)
GLUCOSE SERPL-MCNC: 94 MG/DL (ref 70–99)
HDLC SERPL-MCNC: 85 MG/DL
LDLC SERPL CALC-MCNC: 55 MG/DL
NONHDLC SERPL-MCNC: 70 MG/DL
POTASSIUM SERPL-SCNC: 4.6 MMOL/L (ref 3.4–5.1)
PROT SERPL-MCNC: 6.8 G/DL (ref 6.4–8.2)
SODIUM SERPL-SCNC: 140 MMOL/L (ref 135–145)
TRIGL SERPL-MCNC: 77 MG/DL

## 2024-03-19 RX ORDER — EZETIMIBE 10 MG/1
10 TABLET ORAL DAILY
Qty: 90 TABLET | Refills: 3 | Status: SHIPPED | OUTPATIENT
Start: 2024-03-19

## 2024-03-19 RX ORDER — ROSUVASTATIN CALCIUM 10 MG/1
10 TABLET, COATED ORAL DAILY
Qty: 90 TABLET | Refills: 3 | Status: SHIPPED | OUTPATIENT
Start: 2024-03-19

## 2024-03-19 RX ORDER — LOSARTAN POTASSIUM 50 MG/1
50 TABLET ORAL DAILY
Qty: 90 TABLET | Refills: 3 | Status: SHIPPED | OUTPATIENT
Start: 2024-03-19

## 2024-03-19 SDOH — HEALTH STABILITY: PHYSICAL HEALTH: ON AVERAGE, HOW MANY MINUTES DO YOU ENGAGE IN EXERCISE AT THIS LEVEL?: 60 MIN

## 2024-03-19 SDOH — HEALTH STABILITY: PHYSICAL HEALTH: ON AVERAGE, HOW MANY DAYS PER WEEK DO YOU ENGAGE IN MODERATE TO STRENUOUS EXERCISE (LIKE A BRISK WALK)?: 5 DAYS

## 2024-03-19 ASSESSMENT — ENCOUNTER SYMPTOMS
SUSPICIOUS LESIONS: 0
COUGH: 0
FEVER: 0
WEIGHT GAIN: 0
ALLERGIC/IMMUNOLOGIC COMMENTS: NO NEW FOOD ALLERGIES
WEIGHT LOSS: 0
HEMATOCHEZIA: 0
CHILLS: 0
HEMOPTYSIS: 0
BRUISES/BLEEDS EASILY: 0

## 2024-03-19 ASSESSMENT — PATIENT HEALTH QUESTIONNAIRE - PHQ9
2. FEELING DOWN, DEPRESSED OR HOPELESS: NOT AT ALL
1. LITTLE INTEREST OR PLEASURE IN DOING THINGS: NOT AT ALL
SUM OF ALL RESPONSES TO PHQ9 QUESTIONS 1 AND 2: 0
CLINICAL INTERPRETATION OF PHQ2 SCORE: NO FURTHER SCREENING NEEDED
SUM OF ALL RESPONSES TO PHQ9 QUESTIONS 1 AND 2: 0

## 2025-02-13 ENCOUNTER — TELEPHONE (OUTPATIENT)
Dept: CARDIOLOGY | Age: 75
End: 2025-02-13

## 2025-02-19 ENCOUNTER — TELEPHONE (OUTPATIENT)
Dept: CARDIOLOGY | Age: 75
End: 2025-02-19

## 2025-02-20 ENCOUNTER — LAB REQUISITION (OUTPATIENT)
Dept: LAB | Facility: HOSPITAL | Age: 75
End: 2025-02-20
Payer: MEDICARE

## 2025-02-20 DIAGNOSIS — D48.5 NEOPLASM OF UNCERTAIN BEHAVIOR OF SKIN: ICD-10-CM

## 2025-02-20 PROCEDURE — 88305 TISSUE EXAM BY PATHOLOGIST: CPT | Performed by: DENTIST

## 2025-02-20 PROCEDURE — 88321 CONSLTJ&REPRT SLD PREP ELSWR: CPT | Performed by: DENTIST

## 2025-02-25 RX ORDER — LOSARTAN POTASSIUM 50 MG/1
50 TABLET ORAL DAILY
Qty: 90 TABLET | Refills: 3 | Status: SHIPPED | OUTPATIENT
Start: 2025-02-25

## 2025-02-28 ENCOUNTER — APPOINTMENT (OUTPATIENT)
Dept: CARDIOLOGY | Age: 75
End: 2025-02-28

## 2025-02-28 VITALS
WEIGHT: 121 LBS | HEIGHT: 62 IN | HEART RATE: 60 BPM | SYSTOLIC BLOOD PRESSURE: 124 MMHG | DIASTOLIC BLOOD PRESSURE: 77 MMHG | BODY MASS INDEX: 22.26 KG/M2

## 2025-02-28 DIAGNOSIS — E78.00 PURE HYPERCHOLESTEROLEMIA: ICD-10-CM

## 2025-02-28 DIAGNOSIS — I25.10 ATHEROSCLEROSIS OF NATIVE CORONARY ARTERY OF NATIVE HEART WITHOUT ANGINA PECTORIS: ICD-10-CM

## 2025-02-28 DIAGNOSIS — I10 HYPERTENSION, BENIGN: ICD-10-CM

## 2025-02-28 DIAGNOSIS — Z01.818 PRE-OP EXAM: Primary | ICD-10-CM

## 2025-02-28 LAB
ATRIAL RATE (BPM): 54
P AXIS (DEGREES): 5
PR-INTERVAL (MSEC): 156
QRS-INTERVAL (MSEC): 68
QT-INTERVAL (MSEC): 410
QTC: 388
R AXIS (DEGREES): -14
REPORT TEXT: NORMAL
T AXIS (DEGREES): 37
VENTRICULAR RATE EKG/MIN (BPM): 54

## 2025-02-28 RX ORDER — LEVOCETIRIZINE DIHYDROCHLORIDE 5 MG/1
TABLET, FILM COATED ORAL
COMMUNITY
Start: 2024-10-17

## 2025-02-28 RX ORDER — PENTOXIFYLLINE 400 MG/1
TABLET, EXTENDED RELEASE ORAL
COMMUNITY
Start: 2025-02-18

## 2025-02-28 SDOH — HEALTH STABILITY: PHYSICAL HEALTH: ON AVERAGE, HOW MANY DAYS PER WEEK DO YOU ENGAGE IN MODERATE TO STRENUOUS EXERCISE (LIKE A BRISK WALK)?: 0 DAYS

## 2025-02-28 SDOH — HEALTH STABILITY: PHYSICAL HEALTH: ON AVERAGE, HOW MANY MINUTES DO YOU ENGAGE IN EXERCISE AT THIS LEVEL?: 0 MIN

## 2025-02-28 ASSESSMENT — PATIENT HEALTH QUESTIONNAIRE - PHQ9
2. FEELING DOWN, DEPRESSED OR HOPELESS: NOT AT ALL
SUM OF ALL RESPONSES TO PHQ9 QUESTIONS 1 AND 2: 0
1. LITTLE INTEREST OR PLEASURE IN DOING THINGS: NOT AT ALL
SUM OF ALL RESPONSES TO PHQ9 QUESTIONS 1 AND 2: 0
CLINICAL INTERPRETATION OF PHQ2 SCORE: NO FURTHER SCREENING NEEDED

## 2025-03-06 ENCOUNTER — APPOINTMENT (OUTPATIENT)
Dept: CARDIOLOGY | Age: 75
End: 2025-03-06
Attending: INTERNAL MEDICINE

## 2025-03-14 RX ORDER — SENNOSIDES A AND B 8.6 MG/1
8.6 TABLET, FILM COATED ORAL
COMMUNITY
Start: 2025-03-03

## 2025-03-14 RX ORDER — ONDANSETRON 4 MG/1
4 TABLET, ORALLY DISINTEGRATING ORAL EVERY 8 HOURS PRN
COMMUNITY
Start: 2025-03-03

## 2025-03-14 RX ORDER — ACETAMINOPHEN 500 MG
TABLET ORAL
COMMUNITY
Start: 2025-03-03

## 2025-03-14 RX ORDER — PANTOPRAZOLE SODIUM 40 MG/1
40 TABLET, DELAYED RELEASE ORAL
COMMUNITY
Start: 2025-03-03 | End: 2025-04-02

## 2025-03-14 RX ORDER — ASPIRIN 81 MG/1
81 TABLET ORAL
COMMUNITY
Start: 2025-03-03 | End: 2025-04-02

## 2025-03-14 RX ORDER — CELECOXIB 100 MG/1
100 CAPSULE ORAL DAILY
COMMUNITY
Start: 2025-03-03 | End: 2025-05-02

## 2025-03-18 ENCOUNTER — OFFICE VISIT (OUTPATIENT)
Dept: CARDIOLOGY | Age: 75
End: 2025-03-18

## 2025-03-18 VITALS
WEIGHT: 124 LBS | SYSTOLIC BLOOD PRESSURE: 126 MMHG | OXYGEN SATURATION: 98 % | HEART RATE: 62 BPM | HEIGHT: 62 IN | BODY MASS INDEX: 22.82 KG/M2 | DIASTOLIC BLOOD PRESSURE: 81 MMHG

## 2025-03-18 DIAGNOSIS — E78.2 MIXED HYPERLIPIDEMIA: ICD-10-CM

## 2025-03-18 DIAGNOSIS — I25.10 ATHEROSCLEROSIS OF NATIVE CORONARY ARTERY OF NATIVE HEART WITHOUT ANGINA PECTORIS: ICD-10-CM

## 2025-03-18 DIAGNOSIS — I10 HYPERTENSION, BENIGN: Primary | ICD-10-CM

## 2025-03-18 DIAGNOSIS — R93.1 AGATSTON CAC SCORE 200-399: ICD-10-CM

## 2025-03-18 DIAGNOSIS — E03.9 ACQUIRED HYPOTHYROIDISM: ICD-10-CM

## 2025-03-18 PROCEDURE — 99214 OFFICE O/P EST MOD 30 MIN: CPT | Performed by: INTERNAL MEDICINE

## 2025-03-18 RX ORDER — ROSUVASTATIN CALCIUM 10 MG/1
10 TABLET, COATED ORAL DAILY
Qty: 90 TABLET | Refills: 3 | Status: SHIPPED | OUTPATIENT
Start: 2025-03-18

## 2025-03-18 RX ORDER — TRAMADOL HYDROCHLORIDE 50 MG/1
TABLET ORAL
COMMUNITY
Start: 2025-03-03

## 2025-03-18 RX ORDER — LOSARTAN POTASSIUM 50 MG/1
50 TABLET ORAL DAILY
Qty: 90 TABLET | Refills: 3 | Status: SHIPPED | OUTPATIENT
Start: 2025-03-18

## 2025-03-18 RX ORDER — EZETIMIBE 10 MG/1
10 TABLET ORAL DAILY
Qty: 90 TABLET | Refills: 3 | Status: SHIPPED | OUTPATIENT
Start: 2025-03-18

## 2025-03-18 RX ORDER — OXYCODONE HYDROCHLORIDE 5 MG/1
TABLET ORAL
COMMUNITY
Start: 2025-03-03

## 2025-03-18 SDOH — HEALTH STABILITY: PHYSICAL HEALTH: ON AVERAGE, HOW MANY DAYS PER WEEK DO YOU ENGAGE IN MODERATE TO STRENUOUS EXERCISE (LIKE A BRISK WALK)?: 0 DAYS

## 2025-03-18 SDOH — HEALTH STABILITY: PHYSICAL HEALTH: ON AVERAGE, HOW MANY MINUTES DO YOU ENGAGE IN EXERCISE AT THIS LEVEL?: 0 MIN

## 2025-03-18 ASSESSMENT — PATIENT HEALTH QUESTIONNAIRE - PHQ9
SUM OF ALL RESPONSES TO PHQ9 QUESTIONS 1 AND 2: 0
1. LITTLE INTEREST OR PLEASURE IN DOING THINGS: NOT AT ALL
CLINICAL INTERPRETATION OF PHQ2 SCORE: NO FURTHER SCREENING NEEDED
SUM OF ALL RESPONSES TO PHQ9 QUESTIONS 1 AND 2: 0
2. FEELING DOWN, DEPRESSED OR HOPELESS: NOT AT ALL

## 2025-03-18 ASSESSMENT — ENCOUNTER SYMPTOMS
SUSPICIOUS LESIONS: 0
WEIGHT LOSS: 0
ALLERGIC/IMMUNOLOGIC COMMENTS: NO NEW FOOD ALLERGIES
HEMATOCHEZIA: 0
CHILLS: 0
BRUISES/BLEEDS EASILY: 0
FEVER: 0
COUGH: 0
WEIGHT GAIN: 0
HEMOPTYSIS: 0

## 2025-07-01 ENCOUNTER — TELEPHONE (OUTPATIENT)
Dept: CARDIOLOGY | Age: 75
End: 2025-07-01

## 2025-07-01 DIAGNOSIS — Z01.818 PRE-OP EXAM: ICD-10-CM

## 2025-07-01 DIAGNOSIS — R93.1 AGATSTON CAC SCORE 200-399: ICD-10-CM

## 2025-07-01 DIAGNOSIS — E78.2 MIXED HYPERLIPIDEMIA: Primary | ICD-10-CM

## 2025-07-01 DIAGNOSIS — I25.10 ATHEROSCLEROSIS OF NATIVE CORONARY ARTERY OF NATIVE HEART WITHOUT ANGINA PECTORIS: ICD-10-CM

## 2025-07-01 DIAGNOSIS — I10 HYPERTENSION, BENIGN: ICD-10-CM

## 2025-07-02 ENCOUNTER — APPOINTMENT (OUTPATIENT)
Dept: CARDIOLOGY | Age: 75
End: 2025-07-02
Attending: INTERNAL MEDICINE

## 2025-08-04 ENCOUNTER — HOSPITAL ENCOUNTER (OUTPATIENT)
Dept: CV DIAGNOSTICS | Facility: HOSPITAL | Age: 75
Discharge: HOME | End: 2025-08-04
Attending: FAMILY MEDICINE

## 2025-08-04 DIAGNOSIS — I70.0 ATHEROSCLEROSIS OF ABDOMINAL AORTA: ICD-10-CM

## 2025-08-04 DIAGNOSIS — G89.29 CHRONIC PAIN OF RIGHT KNEE: ICD-10-CM

## 2025-08-04 DIAGNOSIS — I25.10 CORONARY ARTERY DISEASE INVOLVING NATIVE CORONARY ARTERY OF NATIVE HEART WITHOUT ANGINA PECTORIS: ICD-10-CM

## 2025-08-04 DIAGNOSIS — I10 ESSENTIAL HYPERTENSION, BENIGN: ICD-10-CM

## 2025-08-04 DIAGNOSIS — M25.561 CHRONIC PAIN OF RIGHT KNEE: ICD-10-CM

## 2025-08-04 DIAGNOSIS — E78.2 MIXED HYPERLIPIDEMIA: ICD-10-CM

## 2025-08-05 ENCOUNTER — HOSPITAL ENCOUNTER (OUTPATIENT)
Dept: CV DIAGNOSTICS | Facility: HOSPITAL | Age: 75
Discharge: HOME | End: 2025-08-05
Attending: FAMILY MEDICINE

## 2025-08-05 DIAGNOSIS — I10 ESSENTIAL HYPERTENSION, BENIGN: ICD-10-CM

## 2025-08-05 DIAGNOSIS — G89.29 CHRONIC PAIN OF RIGHT KNEE: ICD-10-CM

## 2025-08-05 DIAGNOSIS — I25.10 CORONARY ARTERY DISEASE INVOLVING NATIVE CORONARY ARTERY OF NATIVE HEART WITHOUT ANGINA PECTORIS: ICD-10-CM

## 2025-08-05 DIAGNOSIS — E78.2 MIXED HYPERLIPIDEMIA: ICD-10-CM

## 2025-08-05 DIAGNOSIS — I70.0 ATHEROSCLEROSIS OF ABDOMINAL AORTA: ICD-10-CM

## 2025-08-05 DIAGNOSIS — M25.561 CHRONIC PAIN OF RIGHT KNEE: ICD-10-CM

## 2025-08-05 LAB
% OF MAX PREDICTED HR: 90 %
MAX DIASTOLIC BP: 81 MMHG
MAX HEART RATE: 75 BPM
MAX PREDICTED HEART RATE: 144 BPM
MAX SYSTOLIC BP: 169 MMHG
MAX WORK LOAD: 10

## 2025-08-05 PROCEDURE — 78452 HT MUSCLE IMAGE SPECT MULT: CPT | Performed by: FAMILY MEDICINE

## 2025-08-05 PROCEDURE — 93017 CV STRESS TEST TRACING ONLY: CPT | Performed by: FAMILY MEDICINE

## 2025-08-05 RX ORDER — REGADENOSON 0.08 MG/ML
INJECTION, SOLUTION INTRAVENOUS
Status: COMPLETED
Start: 2025-08-05 | End: 2025-08-05

## 2025-08-05 RX ADMIN — REGADENOSON 0.4 MG: 0.08 INJECTION, SOLUTION INTRAVENOUS at 13:30:00

## (undated) NOTE — LETTER
Patient Name: James Marcano  YOB: 1950          MRN number:  [id number]  Date:  9/5/2023  Referring Physician:  Concepcion Matthews    Discharge Summary  Pt has attended 4 visits in Speech Therapy. Dear Dr. Pastor Franco,  This letter is to inform you of Lorie Baires's progress in speech-language therapy. Since her initial evaluation, she has attended 4 sessions. Therapy sessions have targeted resonant voicing and relaxed throat breathing. A home exercise program (HEP) addressing practice with resonant voicing exercises and use of vocal hygiene practices has been provided and completed consistently. During this progress period, Lorie Keller has demonstrated improved awareness of vocal production and ability to modify her phonation to support oral resonance. At this time, Lorie Keller has expressed desire to discontinue speech therapy sessions due to scheduling conflicts and to focus on HEP. As Lorie Keller has increased her ability to self-monitor and complete HEP, she demonstrates a good prognosis for continued progress apart from skilled sessions. Thank you for your referral. Please re-consult should further concerns arise. Subjective: Patient arrived on time to session. Patient participated actively in therapeutic tasks. Patient stopped Ryaltris prior to allergy appointment and reports increased hoarseness. Patient indicated that she would like to discontinue further sessions at this time d/t scheduling conflicts. Reports consistent completion of HEP and all questions answered regarding HEP. Pain: No pain reported on this date. Objective:  Goals: (to be met in 8 visits)   STG 1: Patient will independently name and report use of appropriate vocal hygiene strategies over the duration of 2 weeks. Progress: Demonstrated understanding of vocal hygiene strategies.       STG 2: Patient will demonstrate resonant voicing and laryngeal relaxation during gliding and chanting in 8/10 attempts, given min verbal and visual cueing. Progress: Utilized chanting to reduce laryngeal tension and support resonant voicing within 8/10 attempts given min verbal and visual cues. Goal met. STG 3:  Patient will demonstrate resonant voicing and laryngeal relaxation during production of /m/ and /h/ initial words and sentences in 8/10 attempts, given min verbal and visual cueing. Progress: 7/10 given min verbal and visual cues    STG 4: Patient will demonstrate resonant voicing in 5-9 word sentences read aloud in 8/10 trials, given min verbal and visual cueing. Progress: 7/10 trials given min verbal and visual cues    HEP: Laryngeal relaxation and resonant voicing exercises  Education: Educated on use of resonant voicing and laryngeal relaxation. Assessment: Patient presented to speech therapy with hoarseness and muscle tension dysphonia. Patient's deficits impacted her ability to communicate without frustration. On this date, patient reviewed exercises targeting laryngeal relaxation, breath support, and resonant voicing. Patient has demonstrated an increased ability to accurately self-monitor productions and modify vocal production to facilitate oral resonance. Patient demonstrated functional gains in use of oral resonance during functional conversation given increased awareness.     VOCAL ANALYSIS     Evaluation 8/15/2023 - Consensus Auditory-Perceptual Evaluation of Voice (CAPE-V)   Overall Severity: Mild-Moderately Deviant, Consistent, and Score: 30/100  Roughness: Mild-Moderately Deviant, Consistent, and Score: 30/100  Breathiness: Mildly Deviant, Consistent, and Score: 0/100  Strain: Mild-Moderately Deviant, Inconsistent, and Score: 25/100  Pitch: Mild-Moderately Deviant, Inconsistent, and Score: 30/100  Loudness: Mildly Deviant, Consistent, and Score: 0/100    Discharge 9/5/2023 - Consensus Auditory-Perceptual Evaluation of Voice (CAPE-V)   Overall Severity: Mild-Moderately Deviant, Inconsistent, and Score: 25/100  Roughness: Mild-Moderately Deviant, Inconsistent, and Score: 25/100  Breathiness: Mildly Deviant, Consistent, and Score: 0/100  Strain: Mildly Deviant, Inconsistent, and Score: 10/100  Pitch: Mildly Deviant, Inconsistent, and Score: 15/100  Loudness: Mildly Deviant, Consistent, and Score: 0/100    Plan: Patient to be discharged from speech-language therapy as she request d/c at this time. Patient to continue completion of HEP. Patient/Family/Caregiver was advised of these findings, precautions, and treatment options and has agreed to actively participate in planning and for this course of care. Thank you for your referral. If you have any questions, please contact me at Dept: [phone]. Sincerely,  Electronically signed by therapist: Eulogio Dakins, SLP           21st Century Cures Act Notice to Patient: Medical documents like this are made available to patients in the interest of transparency. However, be advised this is a medical document and it is intended as [id number] communication between your medical providers. This medical document may contain abbreviations, assessments, medical data, and results or other terms that are unfamiliar. Medical documents are intended to carry relevant information, facts as evident, and the clinical opinion of the practitioner. As such, this medical document may be written in language that appears blunt or direct. You are encouraged to contact your medical provider and/or [facility] if you have any questions about this medical document.